# Patient Record
Sex: MALE | Race: WHITE
[De-identification: names, ages, dates, MRNs, and addresses within clinical notes are randomized per-mention and may not be internally consistent; named-entity substitution may affect disease eponyms.]

---

## 2017-08-10 ENCOUNTER — HOSPITAL ENCOUNTER (OUTPATIENT)
Dept: HOSPITAL 62 - RAD | Age: 67
End: 2017-08-10
Attending: PHYSICIAN ASSISTANT
Payer: COMMERCIAL

## 2017-08-10 DIAGNOSIS — R10.9: Primary | ICD-10-CM

## 2017-08-10 LAB
ALBUMIN SERPL-MCNC: 4.8 G/DL (ref 3.5–5)
ALP SERPL-CCNC: 70 U/L (ref 38–126)
ALT SERPL-CCNC: 43 U/L (ref 21–72)
ANION GAP SERPL CALC-SCNC: 12 MMOL/L (ref 5–19)
AST SERPL-CCNC: 25 U/L (ref 17–59)
BILIRUB DIRECT SERPL-MCNC: 0.4 MG/DL (ref 0–0.4)
BILIRUB SERPL-MCNC: 0.7 MG/DL (ref 0.2–1.3)
BUN SERPL-MCNC: 17 MG/DL (ref 7–20)
CALCIUM: 9.7 MG/DL (ref 8.4–10.2)
CHLORIDE SERPL-SCNC: 103 MMOL/L (ref 98–107)
CO2 SERPL-SCNC: 26 MMOL/L (ref 22–30)
CREAT SERPL-MCNC: 0.86 MG/DL (ref 0.52–1.25)
ERYTHROCYTE [DISTWIDTH] IN BLOOD BY AUTOMATED COUNT: 13.2 % (ref 11.5–14)
GLUCOSE SERPL-MCNC: 105 MG/DL (ref 75–110)
HCT VFR BLD CALC: 42.5 % (ref 37.9–51)
HGB BLD-MCNC: 14.3 G/DL (ref 13.5–17)
HGB HCT DIFFERENCE: 0.4
MCH RBC QN AUTO: 30.6 PG (ref 27–33.4)
MCHC RBC AUTO-ENTMCNC: 33.5 G/DL (ref 32–36)
MCV RBC AUTO: 91 FL (ref 80–97)
POTASSIUM SERPL-SCNC: 4.9 MMOL/L (ref 3.6–5)
PROT SERPL-MCNC: 7.9 G/DL (ref 6.3–8.2)
RBC # BLD AUTO: 4.65 10^6/UL (ref 4.35–5.55)
SODIUM SERPL-SCNC: 140.8 MMOL/L (ref 137–145)
WBC # BLD AUTO: 11.3 10^3/UL (ref 4–10.5)

## 2017-08-10 PROCEDURE — 36415 COLL VENOUS BLD VENIPUNCTURE: CPT

## 2017-08-10 PROCEDURE — 74177 CT ABD & PELVIS W/CONTRAST: CPT

## 2017-08-10 PROCEDURE — 85027 COMPLETE CBC AUTOMATED: CPT

## 2017-08-10 PROCEDURE — 80053 COMPREHEN METABOLIC PANEL: CPT

## 2017-08-10 NOTE — RADIOLOGY REPORT (SQ)
EXAM DESCRIPTION:  CT ABD/PELVIS WITH IV   ORAL



COMPLETED DATE/TIME:  8/10/2017 1:02 pm



REASON FOR STUDY:  ABDOMINAL PAIN



COMPARISON:  None.



TECHNIQUE:  CT scan of the abdomen and pelvis performed using helical scanning technique with dynamic
 intravenous contrast injection.  No oral contrast. Images reviewed with lung, soft tissue, and bone 
windows. Reconstructed coronal and sagittal MPR images reviewed. Delayed images for evaluation of the
 urinary system also acquired. All images stored on PACS.

All CT scanners at this facility use dose modulation, iterative reconstruction, and/or weight based d
osing when appropriate to reduce radiation dose to as low as reasonably achievable (ALARA).

[facility]: Dose Right  [facility]: CareDose    [facility]: Dose Right    [facility]: Teradose 4D    [facility]: Smart Technologies



CONTRAST TYPE AND DOSE:  contrast/concentration: Isovue 370.00 mg/ml; Total Contrast Delivered: 98.0 
ml; Total Saline Delivered: 72.0 ml



RENAL FUNCTION:  Creatinine 0.86



RADIATION DOSE:  Up-to-date CT equipment and radiation dose reduction techniques were employed. CTDIv
ol: 13.6 - 15.7 mGy. DLP: 1596 mGy-cm..



LIMITATIONS:  None.



FINDINGS:  Along the distal sigmoid colon, which loops into the right lower quadrant, there is a 7 cm
 long segment of wall thickening, luminal narrowing, and adjacent inflammation from acute diverticuli
tis.  No free air.  No free fluid.  No abscess.  Report discussed with Calos Love PA-C at [facility], 1300 hours 8/10/2017.

There is oral contrast throughout the remainder of the gastrointestinal tract without evidence of bow
el obstruction or appendicitis.

LOWER CHEST: No significant findings. No nodules or infiltrates.

LIVER: Normal size. No masses.  No dilated ducts.

SPLEEN: Normal size. No focal lesions.

PANCREAS: No masses. No significant calcifications. No adjacent inflammation or peripancreatic fluid 
collections. Pancreatic duct not dilated.

GALLBLADDER: No identified stones by CT criteria. No inflammatory changes to suggest cholecystitis.

ADRENAL GLANDS: No significant masses or asymmetry.

RIGHT KIDNEY AND URETER: No solid masses.   No significant calcifications.   No hydronephrosis or hyd
roureter.

LEFT KIDNEY AND URETER: No solid masses.   No significant calcifications.   No hydronephrosis or hydr
oureter.

AORTA AND VESSELS: No aneurysm. No dissection. Renal arteries, SMA, celiac without stenosis.

RETROPERITONEUM: No retroperitoneal adenopathy, hemorrhage or masses.

BOWEL AND PERITONEAL CAVITY: As above

APPENDIX: Normal.

PELVIS: No mass.  No free fluid. Normal bladder.

ABDOMINAL WALL: No masses. No hernias.

BONES: No significant or acute findings.

OTHER: No other significant finding.



IMPRESSION:  Distal sigmoid colon wall thickening, luminal narrowing and adjacent inflammation from d
iverticulitis.  No abscess.  Report called to the patient's primary caregiver as above.



TECHNICAL DOCUMENTATION:  JOB ID:  [id number]

Quality ID # [id number]: Final reports with documentation of one or more dose reduction techniques (e.g., Au
tomated exposure control, adjustment of the mA and/or kV according to patient size, use of iterative 
reconstruction technique)

 2011 [employer]- All Rights Reserved

## 2019-12-31 ENCOUNTER — HOSPITAL ENCOUNTER (EMERGENCY)
Dept: HOSPITAL 62 - ER | Age: 69
Discharge: HOME | End: 2019-12-31
Payer: MEDICARE

## 2019-12-31 VITALS — SYSTOLIC BLOOD PRESSURE: 129 MMHG | DIASTOLIC BLOOD PRESSURE: 72 MMHG

## 2019-12-31 DIAGNOSIS — S60.031A: ICD-10-CM

## 2019-12-31 DIAGNOSIS — S01.81XA: Primary | ICD-10-CM
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
## 2019-12-31 DIAGNOSIS — R42: ICD-10-CM

## 2019-12-31 LAB
ADD MANUAL DIFF: NO
ALBUMIN SERPL-MCNC: 4.3 G/DL (ref 3.5–5)
ALP SERPL-CCNC: 63 U/L (ref 38–126)
ANION GAP SERPL CALC-SCNC: 8 MMOL/L (ref 5–19)
AST SERPL-CCNC: 27 U/L (ref 17–59)
BASOPHILS # BLD AUTO: 0.1 10^3/UL (ref 0–0.2)
BASOPHILS NFR BLD AUTO: 1.1 % (ref 0–2)
BILIRUB DIRECT SERPL-MCNC: 0.2 MG/DL (ref 0–0.4)
BILIRUB SERPL-MCNC: 0.7 MG/DL (ref 0.2–1.3)
BUN SERPL-MCNC: 31 MG/DL (ref 7–20)
CALCIUM: 9.6 MG/DL (ref 8.4–10.2)
CHLORIDE SERPL-SCNC: 103 MMOL/L (ref 98–107)
CK MB SERPL-MCNC: 1.14 NG/ML (ref ?–4.55)
CK SERPL-CCNC: 147 U/L (ref 55–170)
CO2 SERPL-SCNC: 27 MMOL/L (ref 22–30)
EOSINOPHIL # BLD AUTO: 0.2 10^3/UL (ref 0–0.6)
EOSINOPHIL NFR BLD AUTO: 1.6 % (ref 0–6)
ERYTHROCYTE [DISTWIDTH] IN BLOOD BY AUTOMATED COUNT: 13.4 % (ref 11.5–14)
GLUCOSE SERPL-MCNC: 108 MG/DL (ref 75–110)
HCT VFR BLD CALC: 37.1 % (ref 37.9–51)
HGB BLD-MCNC: 12.7 G/DL (ref 13.5–17)
LYMPHOCYTES # BLD AUTO: 1.5 10^3/UL (ref 0.5–4.7)
LYMPHOCYTES NFR BLD AUTO: 13.6 % (ref 13–45)
MCH RBC QN AUTO: 31.2 PG (ref 27–33.4)
MCHC RBC AUTO-ENTMCNC: 34.1 G/DL (ref 32–36)
MCV RBC AUTO: 91 FL (ref 80–97)
MONOCYTES # BLD AUTO: 0.9 10^3/UL (ref 0.1–1.4)
MONOCYTES NFR BLD AUTO: 8.4 % (ref 3–13)
NEUTROPHILS # BLD AUTO: 8.5 10^3/UL (ref 1.7–8.2)
NEUTS SEG NFR BLD AUTO: 75.3 % (ref 42–78)
PLATELET # BLD: 324 10^3/UL (ref 150–450)
POTASSIUM SERPL-SCNC: 5.4 MMOL/L (ref 3.6–5)
PROT SERPL-MCNC: 7.1 G/DL (ref 6.3–8.2)
RBC # BLD AUTO: 4.07 10^6/UL (ref 4.35–5.55)
TOTAL CELLS COUNTED % (AUTO): 100 %
TROPONIN I SERPL-MCNC: < 0.012 NG/ML
WBC # BLD AUTO: 11.3 10^3/UL (ref 4–10.5)

## 2019-12-31 PROCEDURE — 70450 CT HEAD/BRAIN W/O DYE: CPT

## 2019-12-31 PROCEDURE — 82550 ASSAY OF CK (CPK): CPT

## 2019-12-31 PROCEDURE — 12011 RPR F/E/E/N/L/M 2.5 CM/<: CPT

## 2019-12-31 PROCEDURE — 93010 ELECTROCARDIOGRAM REPORT: CPT

## 2019-12-31 PROCEDURE — 90471 IMMUNIZATION ADMIN: CPT

## 2019-12-31 PROCEDURE — 90714 TD VACC NO PRESV 7 YRS+ IM: CPT

## 2019-12-31 PROCEDURE — 99284 EMERGENCY DEPT VISIT MOD MDM: CPT

## 2019-12-31 PROCEDURE — 0HQ1XZZ REPAIR FACE SKIN, EXTERNAL APPROACH: ICD-10-PCS | Performed by: EMERGENCY MEDICINE

## 2019-12-31 PROCEDURE — 73130 X-RAY EXAM OF HAND: CPT

## 2019-12-31 PROCEDURE — 36415 COLL VENOUS BLD VENIPUNCTURE: CPT

## 2019-12-31 PROCEDURE — 85025 COMPLETE CBC W/AUTO DIFF WBC: CPT

## 2019-12-31 PROCEDURE — 93005 ELECTROCARDIOGRAM TRACING: CPT

## 2019-12-31 PROCEDURE — 80053 COMPREHEN METABOLIC PANEL: CPT

## 2019-12-31 PROCEDURE — 84484 ASSAY OF TROPONIN QUANT: CPT

## 2019-12-31 PROCEDURE — 82553 CREATINE MB FRACTION: CPT

## 2019-12-31 NOTE — RADIOLOGY REPORT (SQ)
EXAM DESCRIPTION:  HAND BILATERAL 3 VIEWS



COMPLETED DATE/TIME:  12/31/2019 11:51 am



REASON FOR STUDY:  fracture pain



COMPARISON:  None.



EXAM PARAMETERS:  NUMBER OF VIEWS: Three views.

TECHNIQUE: AP, lateral and oblique  radiographic images acquired of the right and left hand.

LIMITATIONS: None.



FINDINGS:  MINERALIZATION: Normal.

BONES: No definite acute fracture or dislocation.  No suspicious osseous lesions.  There are degenera
tive changes with osteophytosis and joint space loss greatest at the 3rd and 4th digits bilaterally w
ith bulky osteophytes.

JOINTS: No dislocation.  No chondrocalcinosis.

SOFT TISSUES: No soft tissue swelling.  No foreign body.

OTHER: No other significant finding.



IMPRESSION:  1.  No evidence of acute bony abnormality of either hand.

2.  Dysmorphic appearance of the mid phalanx 3rd digit on the left hand, likely related to remote inj
ury.

3.  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



TECHNICAL DOCUMENTATION:  JOB ID:  [id number]

 2011 [employer]- All Rights Reserved



Reading location - IP/workstation name: [email or web address]

## 2019-12-31 NOTE — RADIOLOGY REPORT (SQ)
EXAM DESCRIPTION:  CT HEAD WITHOUT



COMPLETED DATE/TIME:  12/31/2019 8:41 am



REASON FOR STUDY:  ?LOC, head injury



COMPARISON:  None.



TECHNIQUE:  Axial images acquired through the brain without intravenous contrast.  Images reviewed wi
th bone, brain and subdural windows.  Images stored on PACS.

All CT scanners at this facility use dose modulation, iterative reconstruction, and/or weight based d
osing when appropriate to reduce radiation dose to as low as reasonably achievable (ALARA).

[facility]: Dose Right  [facility]: SureCare    [facility]: Dose Right    [facility]: Teradose 4D    [facility]: Smart Technologies



RADIATION DOSE:  CT Rad equipment meets quality standard of care and radiation dose reduction techniq
ues were employed. CTDIvol: 53.2 mGy. DLP: 1044 mGy-cm. mGy.



LIMITATIONS:  None.



FINDINGS:  VENTRICLES: Minimal dilatation, likely atrophy.

CEREBRUM: No mass effect.  No hemorrhage.  No midline shift.  Normal gray/white matter differentiatio
n.  No evidence for acute territorial infarction.

CEREBELLUM: No mass effect.  No hemorrhage.  No alteration of density.  No evidence for acute infarct
ion.

EXTRAAXIAL SPACES: No fluid collections.

ORBITS AND GLOBE: Symmetrical contour of the globes.

CALVARIUM: No depressed skull fracture.

PARANASAL SINUSES: No air-fluid level.

SOFT TISSUES: No hematoma.



IMPRESSION:  No acute intracranial abnormality.



TECHNICAL DOCUMENTATION:  JOB ID:  [id number]

[id number]

[facility] : Final reports with documentation of one or more dose reduction techniques (e.g., Automate
d exposure control, adjustment of the mA and/or kV according to patient size, use of iterative recons
truction technique)

 2011 [employer]- All Rights Reserved



Reading location - IP/workstation name: [email or web address]

## 2019-12-31 NOTE — ER DOCUMENT REPORT
ED Head/Face/Scalp Injury





<ROB,[email or web address] - Last Filed: 12/31/19 12:34>





- General


TRAVEL OUTSIDE OF THE [place] IN LAST 30 DAYS: No





- HPI


Patient complains to provider of: Laceration - r forehead


Injury to: Forehead


Location of problem: Head


Occurred: Just prior to arrival


Where: Home


Timing: Still present





<SHARRI GALVEZ - Last Filed: 12/31/19 12:43>





- General


Chief Complaint: Facial Injury


Stated Complaint: FALL,EYE INJURY


Primary Care Provider: 


SHARON SHEN MD [Primary Care Provider] - Follow up as needed





- HPI


Notes: 





Patient is a-year-old male who was standing in the kitchen apparently was taking

a step forward slipped and fell forward hitting the head his head on the ground 

and he did not have any loss of consciousness denies any chest pain shortness of

breath dizziness lightheadedness abdominal pain vomiting diarrhea or other 

complaints.  Loss of consciousness.  This shot is not up-to-date. (SHARRI GALVEZ)





- Related Data


Allergies/Adverse Reactions: 


                                        





Penicillins Allergy (Verified 12/31/19 09:11)


   











Past Medical History





- Social History


Smoking Status: Never Smoker


Frequency of alcohol use: None


Drug Abuse: None


Family History: None


Patient has suicidal ideation: No


Patient has homicidal ideation: No





- Past Medical History


Cardiac Medical History: Reports: Hx Hypercholesterolemia, Hx Hypertension





- Immunizations


Immunizations up to date: No


Hx Diphtheria, Pertussis, Tetanus Vaccination: No





<SHARRI GALVEZ - Last Filed: 12/31/19 12:43>





Review of Systems





- Review of Systems


Constitutional: denies: No symptoms reported, See HPI, Chills, Diaphoresis, 

Fever, Malaise, Weakness, Other, Weight gain, Weight loss, Recent illness


EENT: denies: No symptoms reported, See HPI, Eye pain, Eye discharge, Blurred 

vision, Tearing, Double vision, Ear pain, Ear discharge, Nose pain, Nose conges

tion, Nose discharge, Sinus pressure, Sinus discharge, Throat pain, Difficulty 

swallowing, Throat swelling, Mouth pain, Mouth swelling, Dental problem, 

Vertigo, Other


Cardiovascular: denies: No symptoms reported, See HPI, Chest pain, Palpitations,

Heart racing, Orthopnea, Dyspnea, Syncope, Dizziness, Lightheaded, Edema, Other,

Paroxysmal Nocturnal Dysp


Respiratory: denies: No symptoms reported, See HPI, Cough, Hurts to breathe, 

Hemoptysis, Short of breath, Sputum, Stridor, Wheezing, Other


Gastrointestinal: denies: No symptoms reported, See HPI, Abdomen distended, 

Abdominal pain, Diarrhea, Nausea, Vomiting, Constipation, Blood streaked bowels,

Poor appetite, Poor fluid intake, Blood in vomit, Black stools, Rectal bleeding,

Last bowel movement, Fecal incontinence, Other


Genitourinary: denies: No symptoms reported, See HPI, Burning, Dysuria, 

Discharge, Frequency, Flank pain, Hematuria, Incontinence, Pain, Urgency, 

Retention, Other


Musculoskeletal: denies: No symptoms reported, See HPI, Back pain, Gout, Joint 

pain, Joint swelling, Muscle pain, Muscle stiffness, Neck pain, Deformity, Leg 

swelling, Ankle swelling, Other


Neurological/Psychological: Headaches


-: Yes All other systems reviewed and negative





<SHARRI GALVEZ - Last Filed: 12/31/19 12:43>





Physical Exam





<SHARRI GALVEZ - Last Filed: 12/31/19 12:43>





- Vital signs


Vitals: 


                                        











Pulse BP


 


 64   125/103 H


 


 12/31/19 11:40  12/31/19 11:40











Notes: 





PHYSICAL EXAMINATION:


 


GENERAL: Well-appearing, well-nourished and in no acute distress.


 


HEAD:ecchymosis to r eyebrow.  Approximately 2 cm laceration above the right e

yebrow.  On the forehead soft tissue swelling around the eye however there is no

bony step-offs in the zygomatic arch or the orbital rim


 


EYES: Pupils equal round and reactive to light, extraocular movements intact, 

sclera anicteric, conjunctiva are normal.


 


ENT: nares patent, oropharynx clear without exudates.  Moist mucous membranes.


 


NECK: Normal range of motion, supple without lymphadenopathy


 


LUNGS: Breath sounds clear to auscultation bilaterally and equal.  No wheezes 

rales or rhonchi.


 


HEART: Regular rate and rhythm without murmurs


 


ABDOMEN: Soft, nontender, normoactive bowel sounds.  No guarding, no rebound.  

No masses appreciated.


 


EXTREMITIES: Normal range of motion, no pitting or edema.  No cyanosis.  

Bilateral fingers index and middle on the hands are painful to palpation but 

full range of motion.


 


NEUROLOGICAL: No focal neurological deficits. Moves all extremities sp

ontaneously and on command.


 


PSYCH: Normal mood, normal affect.


 


SKIN: Warm, Dry, normal turgor, no rashes or lesions noted. (SHARRI GALVZE)





Course





- Laboratory


Result Diagrams: 


                                 12/31/19 09:45





                                 12/31/19 09:45





<CONOR RIVAS - Last Filed: 12/31/19 12:34>





- Laboratory


Result Diagrams: 


                                 12/31/19 09:45





                                 12/31/19 09:45





- Diagnostic Test


Radiology reviewed: Image reviewed, Reports reviewed





- EKG Interpretation by Me


EKG shows normal: Sinus rhythm


Rate: Bradycardia


When compared to previous EKG there are: Previous EKG unavailable





<SHARRI GALVEZ - Last Filed: 12/31/19 12:43>





- Vital Signs


Vital signs: 


                                        











Temp Pulse Resp BP Pulse Ox


 


    64      125/103 H   


 


    12/31/19 11:41     12/31/19 11:41   














- Laboratory


Laboratory results interpreted by me: 


                                        











  12/31/19 12/31/19





  09:45 09:45


 


WBC  11.3 H 


 


RBC  4.07 L 


 


Hgb  12.7 L 


 


Hct  37.1 L 


 


Absolute Neuts (auto)  8.5 H 


 


Potassium   5.4 H


 


BUN   31 H














- Transfer of Care


Notes: 





12/31/19 10:47


Sinus bradycardia rate of 56 no ST elevation flipped T ectopy is noted no 

previous available to compare. (SHARRI GALVEZ)





Procedures





- Laceration/Wound Repair


  ** Right Face


Wound length (cm): 2.5


Wound's Depth, Shape: Irregular


Laceration pre-procedure: Shur-Clens applied


Anesthetic type: 1% Lidocaine w/epi


Volume Anesthetic (mLs): 1


Wound explored: Clean


Wound Repaired With: Sutures


Suture Size/Type: 6:0, Nylon


Number of Sutures: 6


Layer Closure?: No


Post-procedure NV exam normal: Yes


Complications: No


Adult Head Front/Back picture: 


                            __________________________














                            __________________________





 1 - irreg 2.5 cm lac








<CONOR RIVAS - Last Filed: 12/31/19 12:34>





- Laceration/Wound Repair


  ** Right Face


Notes: 





12/31/19 12:36


After wound was anesthetized with 1% Xylocaine with epinephrine and cleansed, 

wound was repaired with 6-0 nylon sutures performed by Livia NP. 

(CONOR RIVAS)





Discharge





<CONOR RIVAS - Last Filed: 12/31/19 12:34>





<SHARRI GALVEZ KALEE - Last Filed: 12/31/19 12:43>





- Discharge


Clinical Impression: 


 Near syncope





Facial laceration


Qualifiers:


 Encounter type: initial encounter Qualified Code(s): S01.81XA - Laceration 

without foreign body of other part of head, initial encounter





Contusion, fingers


Qualifiers:


 Encounter type: initial encounter Finger: middle finger Damage to nail status: 

without damage Laterality: right Qualified Code(s): S60.031A - Contusion of 

right middle finger without damage to nail, initial encounter





Condition: Good


Disposition: HOME, SELF-CARE


Instructions:  Soap Cleansing ([facility]), Laceration Care ([facility]), Tetanus Immunization

Given ([facility]), Antibiotic Ointment Protection ([facility])


Additional Instructions: 


Wash wound with soap and water twice a day and put Neosporin on the wound.  

Sutures need to come out in 7 days return if you have vomiting severe headache 

memory loss trouble walking you pass out or condition worsens if fingers 

continue to hurt despite resting them and applying ice for 20 minutes 3 times a 

day follow-up with orthopedics for re-x-ray.


Referrals: 


SHARON SHEN MD [Primary Care Provider] - Follow up as needed